# Patient Record
Sex: MALE | Race: WHITE | HISPANIC OR LATINO | Employment: FULL TIME | URBAN - METROPOLITAN AREA
[De-identification: names, ages, dates, MRNs, and addresses within clinical notes are randomized per-mention and may not be internally consistent; named-entity substitution may affect disease eponyms.]

---

## 2019-01-09 ENCOUNTER — OFFICE VISIT (OUTPATIENT)
Dept: MEDICAL GROUP | Facility: PHYSICIAN GROUP | Age: 54
End: 2019-01-09
Payer: COMMERCIAL

## 2019-01-09 VITALS
SYSTOLIC BLOOD PRESSURE: 118 MMHG | OXYGEN SATURATION: 96 % | HEIGHT: 68 IN | BODY MASS INDEX: 28.49 KG/M2 | DIASTOLIC BLOOD PRESSURE: 80 MMHG | RESPIRATION RATE: 16 BRPM | TEMPERATURE: 98.3 F | HEART RATE: 70 BPM | WEIGHT: 188 LBS

## 2019-01-09 DIAGNOSIS — M79.642 BILATERAL HAND PAIN: ICD-10-CM

## 2019-01-09 DIAGNOSIS — Z12.11 SCREENING FOR COLON CANCER: ICD-10-CM

## 2019-01-09 DIAGNOSIS — M79.641 BILATERAL HAND PAIN: ICD-10-CM

## 2019-01-09 DIAGNOSIS — Z00.00 WELLNESS EXAMINATION: ICD-10-CM

## 2019-01-09 DIAGNOSIS — N52.8 OTHER MALE ERECTILE DYSFUNCTION: ICD-10-CM

## 2019-01-09 PROCEDURE — 99386 PREV VISIT NEW AGE 40-64: CPT | Performed by: PHYSICIAN ASSISTANT

## 2019-01-09 RX ORDER — TADALAFIL 10 MG
TABLET ORAL
COMMUNITY
Start: 2018-12-03 | End: 2019-12-31 | Stop reason: SDUPTHER

## 2019-01-09 ASSESSMENT — PATIENT HEALTH QUESTIONNAIRE - PHQ9: CLINICAL INTERPRETATION OF PHQ2 SCORE: 0

## 2019-01-10 NOTE — PROGRESS NOTES
Chief Complaint   Patient presents with   • Establish Care     joint px       HISTORY OF THE PRESENT ILLNESS: Lety Reyes is a 53 y.o. male new patient to our practice. This pleasant patient is here today to establish care and to discuss the evaluation and management of:    Patient is a pleasant 53-year-old male here today to for his annual wellness examination.  He tells me that he is in overall good health.  States he is prescribed Cialis 10 mg tablet as needed for erectile dysfunction.  States erectile dysfunction intermittently occurs.  States medication is managed by his urologist Dr. Oconnell.  He denies having BPH.  He also mentions last winter he developed bilateral hand stiffness with a low-grade intermittent aching pain.  Denies swelling or erythema of joints.  Mentions that wrist can also ache.  States symptoms are predominantly present during the winter.  States he will wear cotton gloves on his hands at night with alleviation of symptoms.  Denies taking over-the-counter medications to help alleviate symptoms.    He tells me his diet is healthy but he could improve on sugar consumption.  States exercise routine consist of cardiovascular and strength training 60 minutes 1-2 times per week.  Denies taking over-the-counter multivitamin.  Admits to getting regular eye and dental exams.  Admits to practicing seatbelt safety and wearing sunscreen when outdoors.    Patient declined influenza vaccination.  Patient will contact his medical insurance discuss Tdap and Shingrix vaccination cost.  Patient agreed to Cologuard.      History reviewed. No pertinent past medical history.    History reviewed. No pertinent surgical history.    Family Status   Relation Status   • Mo    • Fa    • Sis Alive   • Bro Alive   • Shaunna Alive   • Shaunna Alive   • Son Alive     Family History   Problem Relation Age of Onset   • Hypertension Mother    • No Known Problems Sister    • No Known Problems Brother    • No Known  "Problems Daughter    • No Known Problems Daughter    • No Known Problems Son        Social History   Substance Use Topics   • Smoking status: Former Smoker   • Smokeless tobacco: Never Used      Comment: used to smoke smoke 1 cigerette a month for ~5 years and that was 20 years ago.    • Alcohol use Yes      Comment: rarely.        Allergies: Patient has no known allergies.    Current Outpatient Prescriptions Ordered in [facility]   Medication Sig Dispense Refill   • CIALIS 10 MG tablet        No current Epic-ordered facility-administered medications on file.        Review of Systems   Constitutional: Negative for fever, chills, weight loss and malaise/fatigue.   HENT: Negative for ear pain, nosebleeds, congestion, sore throat and neck pain.    Eyes: Negative for blurred vision.   Respiratory: Negative for cough, sputum production, shortness of breath and wheezing.    Cardiovascular: Negative for chest pain, palpitations, orthopnea and leg swelling.   Gastrointestinal: Negative for heartburn, nausea, vomiting and abdominal pain.   Genitourinary: Negative for dysuria, urgency and frequency.   Musculoskeletal: Negative for myalgias, back pain and joint pain.  Positive for bilateral hand pain.  Skin: Negative for rash and itching.   Neurological: Negative for dizziness, tingling, tremors, sensory change, focal weakness and headaches.   Endo/Heme/Allergies: Does not bruise/bleed easily.   Psychiatric/Behavioral: Negative for depression, anxiety, or memory loss.     All other systems reviewed and are negative except as in HPI.    Exam: Blood pressure 118/80, pulse 70, temperature 36.8 °C (98.3 °F), resp. rate 16, height 1.727 m (5' 8\"), weight 85.3 kg (188 lb), SpO2 96 %. Body mass index is 28.59 kg/m².  General: Normal appearing. No distress.  HEENT: Normocephalic. Eyes conjunctiva clear lids without ptosis, pupils equal and reactive to light accommodation, ears normal shape and contour, canals are clear bilaterally, tympanic " membranes are benign, nasal mucosa benign, oropharynx is without erythema, edema or exudates.   Neck: Supple without JVD or bruit. Thyroid is not enlarged.  Pulmonary: Clear to ausculation.  Normal effort. No rales, ronchi, or wheezing.  Cardiovascular: Regular rate and rhythm without murmur. Carotid and radial pulses are intact and equal bilaterally.  Abdomen: Soft, nontender, nondistended. Normal bowel sounds. Liver and spleen are not palpable  Neurologic: Grossly nonfocal  Lymph: No cervical, supraclavicular or axillary lymph nodes are palpable  Skin: Warm and dry.  No obvious lesions.  Musculoskeletal: Normal gait. No extremity cyanosis, clubbing, or edema.  Normal range of motion of upper and lower extremities.  Normal range of motion of wrist and bilateral digits.  Negative Tinel's sign.  Bilateral wrist and hand are negative for pain with palpation.  Negative for erythema/warmth.   Psych: Normal mood and affect. Alert and oriented x3. Judgment and insight is normal.      Medical decision-making and discussion:  1. Wellness examination  A lipid profile and CMP have been ordered to further evaluate patient for cardiovascular conditions, electrolyte abnormalities/liver and kidney function/diabetes. Patient will be contacted with results.  Discussed importance of healthy diet, regular exercise routine, and practicing good sleep hygiene.  Advised patient to continue getting regular eye and dental exams.  Suggested taking over-the-counter multivitamin once daily.  Advised patient to practice seatbelt safety and to wear sunblock and protective clothing when outdoors.      Patient declined influenza vaccination.  Patient will contact his medical insurance discuss Tdap and Shingrix vaccination cost.  Patient agreed to Cologuard.    - COMP METABOLIC PANEL; Future  - Lipid Profile; Future    2. Other male erectile dysfunction  Continue following up with urology as indicated.  Continue current medication regimen.   Continue to monitor.    3. Bilateral hand pain  Advised patient to use over-the-counter Tylenol as needed.  Discussed with patient he can also try over-the-counter anti-inflammatories as needed.  Suggested over-the-counter glucosamine, tumeric, and epsom salt soaks.     Follow-up for worsening symptoms,lack of expected recovery, or should new symptoms or problems arise.    4. Screening for colon cancer  Josefina importance of being screened for colon cancer patient.  Patient agreed to colo-guard.  - COLOGUARD (FIT DNA)      Please note that this dictation was created using voice recognition software. I have made every reasonable attempt to correct obvious errors, but I expect that there are errors of grammar and possibly content that I did not discover before finalizing the note.        Return if symptoms worsen or fail to improve.

## 2019-12-31 ENCOUNTER — OFFICE VISIT (OUTPATIENT)
Dept: MEDICAL GROUP | Facility: PHYSICIAN GROUP | Age: 54
End: 2019-12-31
Payer: COMMERCIAL

## 2019-12-31 VITALS
OXYGEN SATURATION: 95 % | HEIGHT: 68 IN | DIASTOLIC BLOOD PRESSURE: 82 MMHG | HEART RATE: 64 BPM | BODY MASS INDEX: 28.19 KG/M2 | SYSTOLIC BLOOD PRESSURE: 140 MMHG | WEIGHT: 186 LBS | RESPIRATION RATE: 16 BRPM | TEMPERATURE: 98.1 F

## 2019-12-31 DIAGNOSIS — R04.0 MILD EPISTAXIS: ICD-10-CM

## 2019-12-31 DIAGNOSIS — Z12.5 SCREENING FOR MALIGNANT NEOPLASM OF PROSTATE: ICD-10-CM

## 2019-12-31 DIAGNOSIS — Z00.00 WELLNESS EXAMINATION: ICD-10-CM

## 2019-12-31 DIAGNOSIS — Z12.11 SCREENING FOR COLON CANCER: ICD-10-CM

## 2019-12-31 DIAGNOSIS — N52.8 OTHER MALE ERECTILE DYSFUNCTION: ICD-10-CM

## 2019-12-31 DIAGNOSIS — Z13.6 SCREENING FOR CARDIOVASCULAR CONDITION: ICD-10-CM

## 2019-12-31 PROCEDURE — 99396 PREV VISIT EST AGE 40-64: CPT | Performed by: PHYSICIAN ASSISTANT

## 2019-12-31 RX ORDER — TADALAFIL 10 MG
10 TABLET ORAL PRN
Qty: 10 TAB | Refills: 11 | Status: SHIPPED | OUTPATIENT
Start: 2019-12-31 | End: 2020-01-02 | Stop reason: SDUPTHER

## 2019-12-31 NOTE — PROGRESS NOTES
Chief Complaint   Patient presents with   • Annual Exam       HISTORY OF PRESENT ILLNESS: Lety Reyes is an established 54 y.o. male here to discuss the evaluation and management of:    Patient is a pleasant 53-year-old male here today for his annual wellness examination.  He is also requesting that Cialis 10 mg tablet as needed for erectile dysfunction be refilled.  Refill has been placed.  This is a chronic but stable problem.  Patient admits that he intermittently experiences erectile dysfunction and occasion manages symptoms.  He denies side effects or complications from medication.  States medication was initially prescribed by his urologist, Dr. Oconnell.  Patient denies having BPH.  He admits that he has a history of low testosterone but discontinued aldosterone after reading side effects.  He denies nocturia, weak urinary stream, urgency, frequency, dysuria, pelvic pain, dribbling after urination.    He tells me his diet could improve.  States he occasionally eats more sugars and carbohydrates than he should.  He tells me for the most part he feels his diet is healthy and he eats lots of vegetables and rarely eats meat.  Patient does not take an over-the-counter multivitamin but instead takes Lindsay root.  He does do getting regular eye and dental exams.  Admits to doing self testicular exams.  Denies wearing sunblock.  States on average she has 1-2 soft bowel movements per day.  States he lives an active lifestyle.  He tells me he works for [employer] and throughout his work shift he is walking and climbing steps.  States when it is not winter he is cycling and doing other outdoor activities.  Admits to good sleep hygiene.  States on average he sleeps 7 hours per night.  Denies having difficulty falling or staying asleep.  Patient is past due for colonoscopy.  Has agreed to do Cologuard.    Patient states since winter has started he has been experiencing intermittent mild nosebleeds.  States nosebleeds are  predominantly in the a.m.  He admits to using humidifier in the home.  Denies using over-the-counter at home treatments alleviate symptoms.  Admits air is dry in his home and that he has a history of nosebleeds during the winter.    Patient Active Problem List    Diagnosis Date Noted   • Other male erectile dysfunction 2019       Allergies:Patient has no known allergies.    Current Outpatient Medications   Medication Sig Dispense Refill   • Neena Root (NEENA PO) Take  by mouth.     • CIALIS 10 MG tablet Take 1 Tab by mouth as needed for Erectile Dysfunction. 10 Tab 11     No current facility-administered medications for this visit.        Social History     Tobacco Use   • Smoking status: Former Smoker   • Smokeless tobacco: Never Used   • Tobacco comment: used to smoke smoke 1 cigerette a month for ~5 years and that was 20 years ago.    Substance Use Topics   • Alcohol use: Yes     Comment: rarely.    • Drug use: No       Family Status   Relation Name Status   • Mo     • Fa     • Sis x3 Alive   • Bro x5 Alive   • Shaunna  Alive   • Shaunna  Alive   • Son  Alive     Family History   Problem Relation Age of Onset   • Hypertension Mother    • No Known Problems Sister    • No Known Problems Brother    • No Known Problems Daughter    • No Known Problems Daughter    • No Known Problems Son        ROS:  Review of Systems   Constitutional: Negative for fever, chills, weight loss and malaise/fatigue.   HENT: Negative for ear pain, nosebleeds, congestion, sore throat and neck pain.    Eyes: Negative for blurred vision.   Respiratory: Negative for cough, sputum production, shortness of breath and wheezing.    Cardiovascular: Negative for chest pain, palpitations, orthopnea and leg swelling.   Gastrointestinal: Negative for heartburn, nausea, vomiting and abdominal pain.   Genitourinary: Negative for dysuria, urgency and frequency.   Musculoskeletal: Negative for myalgias, back pain and joint pain.   Skin: Negative  "for rash and itching.   Neurological: Negative for dizziness, tingling, tremors, sensory change, focal weakness and headaches.   Endo/Heme/Allergies: Does not bruise/bleed easily.   Psychiatric/Behavioral: Negative for depression, suicidal ideas and memory loss.  The patient is not nervous/anxious and does not have insomnia.    All other systems reviewed and are negative except as in HPI.    Exam: /82 (BP Location: Left arm, Patient Position: Sitting)   Pulse 64   Temp 36.7 °C (98.1 °F) (Temporal)   Resp 16   Ht 1.727 m (5' 8\")   Wt 84.4 kg (186 lb)   SpO2 95%  Body mass index is 28.28 kg/m².  General: Normal appearing. No distress.  HEENT: Normocephalic. Eyes conjunctiva clear lids without ptosis, pupils equal and reactive to light accommodation, ears normal shape and contour, canals are clear bilaterally, tympanic membranes are benign, nasal mucosa benign, oropharynx is without erythema, edema or exudates.   Neck: Supple without JVD. Thyroid is not enlarged.  Pulmonary: Clear to ausculation.  Normal effort. No rales, ronchi, or wheezing.  Cardiovascular: Regular rate and rhythm without murmur.   Abdomen: Soft, nontender, nondistended. Normal bowel sounds. Liver and spleen are not palpable  Neurologic: Grossly nonfocal.  Cranial nerves are normal.   Lymph: No cervical or supraclavicular lymph nodes are palpable  Skin: Warm and dry.  No rashes or suspicious skin lesions.  Musculoskeletal: Normal gait. No extremity cyanosis, clubbing, or edema.  Psych: Normal mood and affect. Alert and oriented x3. Judgment and insight is normal.    Medical decision-making and discussion:  1. Wellness examination  - Encouraged diet high in fruits, vegetables, and fiber. And a diet low in salt, refined carbohydrates, cholesterol, saturated fat, and trans fatty acids.    - Encouraged  a minimum of 30 minutes of moderate intensity aerobic exercise (eg, brisk walking) is recommended on five days each week. Or 20 minutes of " vigorous-intensity aerobic exercise (eg, jogging) on three days each week.     Advised patient continue getting regular eye dental exams.  Continue doing self testicular exams.  Suggested over-the-counter multivitamin.  Advised patient to wear sunscreen.  Continue practicing seatbelt safety.  Annual lab work has been ordered for patient complete.  Patient be contacted with results.      - PROSTATE SPECIFIC AG SCREENING; Future  - Comp Metabolic Panel; Future  - Lipid Profile; Future    2. Other male erectile dysfunction  PSA has been ordered to further evaluate patient.  Cialis has been refilled.  Discussed common side effects and adverse reaction of medication with patient.  Discussed ED precautions with patient.  Continue to monitor.    - PROSTATE SPECIFIC AG SCREENING; Future  - CIALIS 10 MG tablet; Take 1 Tab by mouth as needed for Erectile Dysfunction.  Dispense: 10 Tab; Refill: 11    3. Mild epistaxis  Suspect intermittent nosebleeds is secondary to lack of humidity.  Advised patient to continue using committed 5.  Suggest for patient to use a saline spray.  Also suggested over-the-counter Vaseline.    Follow-up for worsening symptoms,lack of expected recovery, or should new symptoms or problems arise.    4. Screening for cardiovascular condition    - Lipid Profile; Future    5. Screening for colon cancer    - COLOGMODESTO (FIT DNA)      Please note that this dictation was created using voice recognition software. I have made every reasonable attempt to correct obvious errors, but I expect that there are errors of grammar and possibly content that I did not discover before finalizing the note.    Assessment/Plan:  1. Wellness examination  PROSTATE SPECIFIC AG SCREENING    Comp Metabolic Panel    Lipid Profile   2. Other male erectile dysfunction  PROSTATE SPECIFIC AG SCREENING    CIALIS 10 MG tablet   3. Mild epistaxis     4. Screening for cardiovascular condition  Lipid Profile   5. Screening for colon cancer   JOSE (FIT DNA)       No follow-ups on file.

## 2020-01-02 DIAGNOSIS — N52.8 OTHER MALE ERECTILE DYSFUNCTION: ICD-10-CM

## 2020-01-02 RX ORDER — TADALAFIL 10 MG
10 TABLET ORAL PRN
Qty: 10 TAB | Refills: 11 | Status: SHIPPED | OUTPATIENT
Start: 2020-01-02 | End: 2021-05-17

## 2020-01-02 NOTE — TELEPHONE ENCOUNTER
Was the patient seen in the last year in this department? Yes    Does patient have an active prescription for medications requested? No     Received Request Via: Pharmacy     Last refill was sent with a ELIDIA-1 so pharmacy filled and was going to cost patient $731. Pharmacy and patient would like resent as generic

## 2020-01-22 ENCOUNTER — OFFICE VISIT (OUTPATIENT)
Dept: URGENT CARE | Facility: CLINIC | Age: 55
End: 2020-01-22
Payer: COMMERCIAL

## 2020-01-22 ENCOUNTER — TELEPHONE (OUTPATIENT)
Dept: MEDICAL GROUP | Facility: PHYSICIAN GROUP | Age: 55
End: 2020-01-22

## 2020-01-22 VITALS
HEART RATE: 66 BPM | TEMPERATURE: 99 F | OXYGEN SATURATION: 98 % | DIASTOLIC BLOOD PRESSURE: 96 MMHG | RESPIRATION RATE: 16 BRPM | SYSTOLIC BLOOD PRESSURE: 156 MMHG | WEIGHT: 190.6 LBS | BODY MASS INDEX: 28.89 KG/M2 | HEIGHT: 68 IN

## 2020-01-22 DIAGNOSIS — K80.50 BILIARY COLIC: ICD-10-CM

## 2020-01-22 PROCEDURE — 99213 OFFICE O/P EST LOW 20 MIN: CPT | Performed by: PHYSICIAN ASSISTANT

## 2020-01-22 ASSESSMENT — ENCOUNTER SYMPTOMS
SORE THROAT: 0
DIZZINESS: 0
FEVER: 0
NAUSEA: 1
COUGH: 0
MYALGIAS: 0
EYE PAIN: 0
VOMITING: 1
HEARTBURN: 1
EYE REDNESS: 0
NECK PAIN: 0
CONSTIPATION: 0
ABDOMINAL PAIN: 1
EYE DISCHARGE: 0
DIARRHEA: 0
BRUISES/BLEEDS EASILY: 0
HEADACHES: 0
SHORTNESS OF BREATH: 0
CHILLS: 0

## 2020-01-23 NOTE — TELEPHONE ENCOUNTER
1. Caller Name: Vonnie                                         Call Back Number: [phone] (home)       Patient approves a detailed voicemail message: N\A    Received a call from Pt wife with concerns that Pt was experiencing abdominal pain that started last night. She was hoping that we would be able to schedule him but informed that unfortunately Mimi is booked the rest of the week. Advised to go to urgent care. Wife agreed and will take  to [facility].

## 2020-01-23 NOTE — PROGRESS NOTES
Subjective:      Lety Reyes is a 55 y.o. male who presents with RUQ Pain (x4 days)            HPI  55 year old male presents to urgent care with RUQ abdominal pain.   Pain has been intermittent over the last 1 week.   Worse after eating fatty foods.   Patient reports 3 episodes of vomiting. Patient denies fevers, diarrhea, blood in stool, or urinary symptoms. No history of abdominal surgeries. No recent travel outside the [place].   Denies other associated aggravating or alleviating factors.     Review of Systems   Constitutional: Positive for malaise/fatigue. Negative for chills and fever.   HENT: Negative for congestion and sore throat.    Eyes: Negative for pain, discharge and redness.   Respiratory: Negative for cough and shortness of breath.    Cardiovascular: Negative for chest pain.   Gastrointestinal: Positive for abdominal pain, heartburn, nausea and vomiting. Negative for constipation and diarrhea.   Genitourinary: Negative for dysuria.   Musculoskeletal: Negative for myalgias and neck pain.   Skin: Negative for rash.   Neurological: Negative for dizziness and headaches.   Endo/Heme/Allergies: Does not bruise/bleed easily.     History reviewed. No pertinent past medical history.  Current Outpatient Medications on File Prior to Visit   Medication Sig Dispense Refill   • CIALIS 10 MG tablet Take 1 Tab by mouth as needed for Erectile Dysfunction. 10 Tab 11   • Neena Root (NEENA PO) Take  by mouth.       No current facility-administered medications on file prior to visit.      No Known Allergies  Social History     Tobacco Use   • Smoking status: Former Smoker   • Smokeless tobacco: Never Used   • Tobacco comment: used to smoke smoke 1 cigerette a month for ~5 years and that was 20 years ago.    Substance Use Topics   • Alcohol use: Yes     Comment: rarely.       Objective:     /96 (BP Location: Left arm, Patient Position: Sitting, BP Cuff Size: Adult)   Pulse 66   Temp 37.2 °C (99 °F)   Resp 16   Ht  "1.727 m (5' 8\")   Wt 86.5 kg (190 lb 9.6 oz)   SpO2 98%   BMI 28.98 kg/m²      Physical Exam  Vitals signs reviewed.   Constitutional:       Appearance: He is well-developed.   HENT:      Head: Normocephalic and atraumatic.   Eyes:      Conjunctiva/sclera: Conjunctivae normal.   Neck:      Musculoskeletal: Normal range of motion and neck supple.   Cardiovascular:      Rate and Rhythm: Normal rate and regular rhythm.   Pulmonary:      Effort: Pulmonary effort is normal. No respiratory distress.      Breath sounds: Normal breath sounds.   Abdominal:      General: There is no distension.      Palpations: Abdomen is soft.      Tenderness: There is tenderness in the right upper quadrant. There is no right CVA tenderness, guarding or rebound. Negative signs include Arizmendi's sign.   Musculoskeletal: Normal range of motion.   Skin:     General: Skin is warm and dry.      Findings: No erythema.   Neurological:      General: No focal deficit present.      Mental Status: He is alert and oriented to person, place, and time.   Psychiatric:         Behavior: Behavior normal.                 Assessment/Plan:     1. Biliary colic  US-RUQ     US ordered for evaluation of RUQ abdominal pain. Patient advised to refrain from eating fatty food. Recommend patient proceed to ED for further evaluation if symptoms worsening prior to scheduled out patient imaging.   Patient verbalized understanding of treatment plan and has no further questions regarding care.     Your blood pressure is elevated here in Urgent Care. Please monitor your blood pressure over the next several days. If your blood pressure continues to be 120/80 or higher please contact your physician for blood pressure management.        Addendum: 2/4/2020 at 08:11am     Patient waited until 1/31/2020 to have imaging ordered on date of visit, 1/22/2020.   Attempted to contact patient by phone regarding results and referral to general surgery for further evaluation. Unable to " leave voice mail, will follow up.     1/31/2020 10:31 AM  HISTORY/REASON FOR EXAM:  Biliary colic, RUQ pain  TECHNIQUE/EXAM DESCRIPTION AND NUMBER OF VIEWS:  Real-time sonography of the liver and biliary tree.     COMPARISON: None     FINDINGS:  The liver is normal in contour. There is a hypoechoic right posterior hepatic lesion measuring 1.9 x 2.1 x 2.1 cm with no abnormal vascularity the remainder of the liver is slightly heterogeneous and echogenic. The liver measures 15.63 cm.     The gallbladder is normal. There is no evidence of cholelithiasis.  The gallbladder wall thickness measures 0.58 cm. There is no pericholecystic fluid.  The common duct measures 0.24 cm.     The visualized pancreas is unremarkable.  The visualized aorta is normal in caliber.     Intrahepatic IVC is patent.     The portal vein is patent with hepatopetal flow. The MPV measures 1.08 cm.     The right kidney measures 10.71 cm.     There is no ascites.     IMPRESSION:     1.  The gallbladder demonstrates a few small stones with sludge and gallbladder wall thickening but there is no biliary dilatation. This could represent changes of chronic cholecystitis.  2.  Heterogeneous liver with probable hepatic fatty infiltration.  3.  Nonspecific 2.1 cm hypoechoic right lobe hepatic lesion. Differential diagnosis includes probably focal fatty sparing versus less likely solid mass. This could be differentiated with MRI on a nonemergent basis.

## 2020-01-31 ENCOUNTER — HOSPITAL ENCOUNTER (OUTPATIENT)
Dept: RADIOLOGY | Facility: MEDICAL CENTER | Age: 55
End: 2020-01-31
Attending: PHYSICIAN ASSISTANT
Payer: COMMERCIAL

## 2020-01-31 DIAGNOSIS — K80.50 BILIARY COLIC: ICD-10-CM

## 2020-01-31 PROCEDURE — 76705 ECHO EXAM OF ABDOMEN: CPT

## 2020-02-24 ENCOUNTER — HOSPITAL ENCOUNTER (OUTPATIENT)
Dept: LAB | Facility: MEDICAL CENTER | Age: 55
End: 2020-02-24
Attending: PHYSICIAN ASSISTANT
Payer: COMMERCIAL

## 2020-02-24 DIAGNOSIS — Z00.00 WELLNESS EXAMINATION: ICD-10-CM

## 2020-02-24 DIAGNOSIS — Z13.6 SCREENING FOR CARDIOVASCULAR CONDITION: ICD-10-CM

## 2020-02-24 DIAGNOSIS — N52.8 OTHER MALE ERECTILE DYSFUNCTION: ICD-10-CM

## 2020-02-24 DIAGNOSIS — Z12.5 SCREENING FOR MALIGNANT NEOPLASM OF PROSTATE: ICD-10-CM

## 2020-02-24 LAB
ALBUMIN SERPL BCP-MCNC: 4.2 G/DL (ref 3.2–4.9)
ALBUMIN/GLOB SERPL: 1.3 G/DL
ALP SERPL-CCNC: 75 U/L (ref 30–99)
ALT SERPL-CCNC: 22 U/L (ref 2–50)
ANION GAP SERPL CALC-SCNC: 9 MMOL/L (ref 0–11.9)
AST SERPL-CCNC: 19 U/L (ref 12–45)
BILIRUB SERPL-MCNC: 0.7 MG/DL (ref 0.1–1.5)
BUN SERPL-MCNC: 13 MG/DL (ref 8–22)
CALCIUM SERPL-MCNC: 9.3 MG/DL (ref 8.5–10.5)
CHLORIDE SERPL-SCNC: 106 MMOL/L (ref 96–112)
CHOLEST SERPL-MCNC: 188 MG/DL (ref 100–199)
CO2 SERPL-SCNC: 23 MMOL/L (ref 20–33)
CREAT SERPL-MCNC: 0.95 MG/DL (ref 0.5–1.4)
FASTING STATUS PATIENT QL REPORTED: NORMAL
GLOBULIN SER CALC-MCNC: 3.3 G/DL (ref 1.9–3.5)
GLUCOSE SERPL-MCNC: 96 MG/DL (ref 65–99)
HDLC SERPL-MCNC: 30 MG/DL
LDLC SERPL CALC-MCNC: 133 MG/DL
POTASSIUM SERPL-SCNC: 4.3 MMOL/L (ref 3.6–5.5)
PROT SERPL-MCNC: 7.5 G/DL (ref 6–8.2)
SODIUM SERPL-SCNC: 138 MMOL/L (ref 135–145)
TRIGL SERPL-MCNC: 125 MG/DL (ref 0–149)

## 2020-02-24 PROCEDURE — 80053 COMPREHEN METABOLIC PANEL: CPT

## 2020-02-24 PROCEDURE — 84153 ASSAY OF PSA TOTAL: CPT

## 2020-02-24 PROCEDURE — 80061 LIPID PANEL: CPT

## 2020-02-24 PROCEDURE — 36415 COLL VENOUS BLD VENIPUNCTURE: CPT

## 2020-02-25 LAB — PSA SERPL-MCNC: 0.48 NG/ML (ref 0–4)

## 2020-03-11 ENCOUNTER — HOSPITAL ENCOUNTER (OUTPATIENT)
Facility: MEDICAL CENTER | Age: 55
End: 2020-03-11
Attending: SURGERY | Admitting: SURGERY
Payer: COMMERCIAL

## 2020-03-12 ENCOUNTER — HOSPITAL ENCOUNTER (OUTPATIENT)
Dept: LAB | Facility: MEDICAL CENTER | Age: 55
End: 2020-03-12
Attending: PHYSICIAN ASSISTANT
Payer: COMMERCIAL

## 2020-03-12 ENCOUNTER — OFFICE VISIT (OUTPATIENT)
Dept: MEDICAL GROUP | Facility: PHYSICIAN GROUP | Age: 55
End: 2020-03-12
Payer: COMMERCIAL

## 2020-03-12 VITALS
OXYGEN SATURATION: 97 % | WEIGHT: 179.2 LBS | BODY MASS INDEX: 27.16 KG/M2 | TEMPERATURE: 98.8 F | RESPIRATION RATE: 20 BRPM | SYSTOLIC BLOOD PRESSURE: 140 MMHG | DIASTOLIC BLOOD PRESSURE: 72 MMHG | HEIGHT: 68 IN | HEART RATE: 72 BPM

## 2020-03-12 DIAGNOSIS — K80.20 CALCULUS OF GALLBLADDER WITHOUT CHOLECYSTITIS WITHOUT OBSTRUCTION: ICD-10-CM

## 2020-03-12 DIAGNOSIS — K76.9 LIVER LESION: ICD-10-CM

## 2020-03-12 LAB
BASOPHILS # BLD AUTO: 0.6 % (ref 0–1.8)
BASOPHILS # BLD: 0.1 K/UL (ref 0–0.12)
EOSINOPHIL # BLD AUTO: 0.29 K/UL (ref 0–0.51)
EOSINOPHIL NFR BLD: 1.8 % (ref 0–6.9)
ERYTHROCYTE [DISTWIDTH] IN BLOOD BY AUTOMATED COUNT: 42.3 FL (ref 35.9–50)
HCT VFR BLD AUTO: 43.2 % (ref 42–52)
HGB BLD-MCNC: 14.3 G/DL (ref 14–18)
IMM GRANULOCYTES # BLD AUTO: 0.13 K/UL (ref 0–0.11)
IMM GRANULOCYTES NFR BLD AUTO: 0.8 % (ref 0–0.9)
LYMPHOCYTES # BLD AUTO: 2.43 K/UL (ref 1–4.8)
LYMPHOCYTES NFR BLD: 15.1 % (ref 22–41)
MCH RBC QN AUTO: 28.4 PG (ref 27–33)
MCHC RBC AUTO-ENTMCNC: 33.1 G/DL (ref 33.7–35.3)
MCV RBC AUTO: 85.7 FL (ref 81.4–97.8)
MONOCYTES # BLD AUTO: 1.36 K/UL (ref 0–0.85)
MONOCYTES NFR BLD AUTO: 8.4 % (ref 0–13.4)
NEUTROPHILS # BLD AUTO: 11.8 K/UL (ref 1.82–7.42)
NEUTROPHILS NFR BLD: 73.3 % (ref 44–72)
NRBC # BLD AUTO: 0 K/UL
NRBC BLD-RTO: 0 /100 WBC
PLATELET # BLD AUTO: 418 K/UL (ref 164–446)
PMV BLD AUTO: 11.3 FL (ref 9–12.9)
RBC # BLD AUTO: 5.04 M/UL (ref 4.7–6.1)
WBC # BLD AUTO: 16.1 K/UL (ref 4.8–10.8)

## 2020-03-12 PROCEDURE — 99214 OFFICE O/P EST MOD 30 MIN: CPT | Performed by: PHYSICIAN ASSISTANT

## 2020-03-12 PROCEDURE — 85025 COMPLETE CBC W/AUTO DIFF WBC: CPT

## 2020-03-12 PROCEDURE — 36415 COLL VENOUS BLD VENIPUNCTURE: CPT

## 2020-03-12 RX ORDER — IBUPROFEN 200 MG
200 TABLET ORAL EVERY 6 HOURS PRN
COMMUNITY

## 2020-03-12 RX ORDER — OXYCODONE HYDROCHLORIDE 5 MG/1
TABLET ORAL
COMMUNITY
Start: 2020-03-10

## 2020-03-12 RX ORDER — AMOXICILLIN AND CLAVULANATE POTASSIUM 500; 125 MG/1; MG/1
TABLET, FILM COATED ORAL
COMMUNITY
Start: 2020-03-10

## 2020-03-12 ASSESSMENT — PATIENT HEALTH QUESTIONNAIRE - PHQ9: CLINICAL INTERPRETATION OF PHQ2 SCORE: 0

## 2020-03-23 NOTE — PROGRESS NOTES
Chief Complaint   Patient presents with   • Medical Clearance     cancelled surgery,wants to discuss labs        HISTORY OF PRESENT ILLNESS: Lety Gilman is an established 55 y.o. male here to discuss the evaluation and management of:      Patient is a pleasant 55-year-old male here today to discuss right upper quadrant pain.  He tells me on 3/6/2020 he experienced nausea, vomiting, right upper quadrant pain that was very tender to palpation.  States he was also experiencing subjective fever and chills.  He tells me that he is seen 2 other providers since then and 1 of them was a general surgeon.  States he was scheduled for surgery but canceled surgery because he is not convinced that he has gallbladder disease.  He does tell me that he notices right upper quadrant pain after eating fatty foods or rich foods.  States alcohol also exacerbate symptoms.    Discussed with patient in great detail that his symptoms are highly suspicious for gallbladder disease and then discussed with patient after reviewing his abdominal ultrasound from 1/31/20 it is in my medical opinion that he is experiencing acute gallbladder attacks.  Discussed with patient abdominal ultrasound results with 1/31/2020 indicated the following:    IMPRESSION:     1.  The gallbladder demonstrates a few small stones with sludge and gallbladder wall thickening but there is no biliary dilatation. This could represent changes of chronic cholecystitis.  2.  Heterogeneous liver with probable hepatic fatty infiltration.  3.  Nonspecific 2.1 cm hypoechoic right lobe hepatic lesion. Differential diagnosis includes probably focal fatty sparing versus less likely solid mass. This could be differentiated with MRI on a nonemergent basis.    Patient is still not convinced that he has gallbladder disease.  He tells me that his general surgeon placed him on antibiotic.  Patient is never recall what antibiotic he is taking for acute gallbladder attack  symptoms.  On physical examination positive for Arizmendi sign.  Vitals are stable.    Patient denies currently experiencing nausea, vomiting, fever, chills, night sweats, unintentional weight loss, bowel habit changes, melena, hematochezia, ascites, shortness of breath, lymphadenopathy.      Patient Active Problem List    Diagnosis Date Noted   • Other male erectile dysfunction 2019       Allergies:Patient has no known allergies.    Current Outpatient Medications   Medication Sig Dispense Refill   • amoxicillin-clavulanate (AUGMENTIN) 500-125 MG Tab      • oxyCODONE immediate-release (ROXICODONE) 5 MG Tab      • ibuprofen (MOTRIN) 200 MG Tab Take 200 mg by mouth every 6 hours as needed.     • CIALIS 10 MG tablet Take 1 Tab by mouth as needed for Erectile Dysfunction. 10 Tab 11   • Neena Root (NEENA PO) Take  by mouth.       No current facility-administered medications for this visit.        Social History     Tobacco Use   • Smoking status: Former Smoker   • Smokeless tobacco: Never Used   • Tobacco comment: used to smoke smoke 1 cigerette a month for ~5 years and that was 20 years ago.    Substance Use Topics   • Alcohol use: Yes     Comment: rarely.    • Drug use: No       Family Status   Relation Name Status   • Mo     • Fa     • Sis x3 Alive   • Bro x5 Alive   • Shaunna  Alive   • Shaunna  Alive   • Son  Alive     Family History   Problem Relation Age of Onset   • Hypertension Mother    • No Known Problems Sister    • No Known Problems Brother    • No Known Problems Daughter    • No Known Problems Daughter    • No Known Problems Son        ROS:  Review of Systems   Constitutional: Negative for fever, chills, weight loss and malaise/fatigue.   HENT: Negative for ear pain, nosebleeds, congestion, sore throat and neck pain.    Eyes: Negative for blurred vision.   Respiratory: Negative for cough, sputum production, shortness of breath and wheezing.    Cardiovascular: Negative for chest pain, palpitations,  "orthopnea and leg swelling.   Gastrointestinal: Negative for heartburn, nausea, vomiting.  Positive for right upper abdominal pain.  Genitourinary: Negative for dysuria, urgency and frequency.   Musculoskeletal: Negative for myalgias, back pain and joint pain.   Skin: Negative for rash and itching.   Neurological: Negative for dizziness, tingling, tremors, sensory change, focal weakness and headaches.   Endo/Heme/Allergies: Does not bruise/bleed easily.   Psychiatric/Behavioral: Negative for depression, suicidal ideas and memory loss.  The patient is not nervous/anxious and does not have insomnia.    All other systems reviewed and are negative except as in HPI.    Exam: /72 (BP Location: Left arm, Patient Position: Sitting)   Pulse 72   Temp 37.1 °C (98.8 °F) (Temporal)   Resp 20   Ht 1.727 m (5' 8\")   Wt 81.3 kg (179 lb 3.2 oz)   SpO2 97%  Body mass index is 27.25 kg/m².  General: Normal appearing. No distress.  HEENT: Normocephalic. Eyes conjunctiva clear lids without ptosis, ears normal shape and contour.  Neck: Supple without JVD. Thyroid is not enlarged.  Pulmonary: Clear to ausculation.  Normal effort. No rales, ronchi, or wheezing.  Cardiovascular: Regular rate and rhythm without murmur.   Abdomen: Soft andnondistended. Normal bowel sounds. Liver and spleen are not palpable.  Positive Arizmendi sign.  Neurologic: Grossly nonfocal.  Cranial nerves are normal.   Lymph: No cervical or  supraclavicular  lymph nodes are palpable  Skin: Warm and dry.  No rashes or suspicious skin lesions.  Musculoskeletal: Normal gait. No extremity cyanosis, clubbing, or edema.  Psych: Normal mood and affect. Alert and oriented x3. Judgment and insight is normal.    Medical decision-making and discussion:  1. Liver lesion  MRI of abdomen with and without contrast has been ordered to further evaluate liver lesion found on abdominal ultrasound from 1/31/2020.  Patient be contacted with results.    - MR-ABDOMEN-WITH & W/O; " Future  - CBC WITH DIFFERENTIAL; Future    2. Calculus of gallbladder without cholecystitis without obstruction  Lab work has been ordered to further evaluate patient.  Advised patient continue antibiotics as prescribed.  Discussed with patient given his symptoms and ultrasound results it is in my medical opinion that he does indeed have gallbladder disease.  Discussed with patient great detail that he may experience acute gallbladder attacks more frequently and to consider surgical intervention as a treatment option.  Discussed cholecystectomy in great detail with patient.  Advised patient to follow-up with his general surgeon to discuss surgery in more detail.  Patient agreed to plan.  Discussed ED precautions with patient.  Advised patient to avoid known triggers.  Suggested for patient eat a bland diet.    - CBC WITH DIFFERENTIAL; Future      Please note that this dictation was created using voice recognition software. I have made every reasonable attempt to correct obvious errors, but I expect that there are errors of grammar and possibly content that I did not discover before finalizing the note.    Assessment/Plan:  1. Liver lesion  MR-ABDOMEN-WITH & W/O    CBC WITH DIFFERENTIAL   2. Calculus of gallbladder without cholecystitis without obstruction  CBC WITH DIFFERENTIAL   3. Screening for colon cancer         Return if symptoms worsen or fail to improve.

## 2021-03-15 DIAGNOSIS — Z23 NEED FOR VACCINATION: ICD-10-CM
